# Patient Record
Sex: MALE | Race: WHITE | URBAN - METROPOLITAN AREA
[De-identification: names, ages, dates, MRNs, and addresses within clinical notes are randomized per-mention and may not be internally consistent; named-entity substitution may affect disease eponyms.]

---

## 2023-12-18 ENCOUNTER — TELEPHONE (OUTPATIENT)
Dept: NEUROLOGY | Facility: MEDICAL CENTER | Age: 63
End: 2023-12-18
Payer: COMMERCIAL

## 2023-12-18 NOTE — TELEPHONE ENCOUNTER
Spoke to patient informed we have imaging report and I requested actual imaging. I will let him know when we receive them.

## 2023-12-19 ENCOUNTER — HOSPITAL ENCOUNTER (OUTPATIENT)
Dept: RADIOLOGY | Facility: MEDICAL CENTER | Age: 63
End: 2023-12-19
Attending: NURSE PRACTITIONER
Payer: COMMERCIAL

## 2023-12-19 ENCOUNTER — HOSPITAL ENCOUNTER (OUTPATIENT)
Dept: RADIOLOGY | Facility: MEDICAL CENTER | Age: 63
End: 2023-12-19
Payer: COMMERCIAL

## 2023-12-19 ENCOUNTER — TELEPHONE (OUTPATIENT)
Dept: NEUROLOGY | Facility: MEDICAL CENTER | Age: 63
End: 2023-12-19
Payer: COMMERCIAL

## 2023-12-19 NOTE — TELEPHONE ENCOUNTER
Call [facility] we still have not received imaging. Spoke to liang and she call the department that pushes them through and they sent again.

## 2023-12-20 ENCOUNTER — OFFICE VISIT (OUTPATIENT)
Dept: NEUROLOGY | Facility: MEDICAL CENTER | Age: 63
End: 2023-12-20
Attending: STUDENT IN AN ORGANIZED HEALTH CARE EDUCATION/TRAINING PROGRAM
Payer: COMMERCIAL

## 2023-12-20 VITALS
WEIGHT: 204.37 LBS | HEIGHT: 64 IN | OXYGEN SATURATION: 97 % | BODY MASS INDEX: 34.89 KG/M2 | SYSTOLIC BLOOD PRESSURE: 140 MMHG | HEART RATE: 73 BPM | RESPIRATION RATE: 16 BRPM | DIASTOLIC BLOOD PRESSURE: 84 MMHG | TEMPERATURE: 97.3 F

## 2023-12-20 DIAGNOSIS — I63.9 OCCIPITAL STROKE (HCC): ICD-10-CM

## 2023-12-20 PROCEDURE — 3079F DIAST BP 80-89 MM HG: CPT | Performed by: INTERNAL MEDICINE

## 2023-12-20 PROCEDURE — 99202 OFFICE O/P NEW SF 15 MIN: CPT | Performed by: INTERNAL MEDICINE

## 2023-12-20 PROCEDURE — 99417 PROLNG OP E/M EACH 15 MIN: CPT | Performed by: INTERNAL MEDICINE

## 2023-12-20 PROCEDURE — 3077F SYST BP >= 140 MM HG: CPT | Performed by: INTERNAL MEDICINE

## 2023-12-20 PROCEDURE — 99205 OFFICE O/P NEW HI 60 MIN: CPT | Performed by: INTERNAL MEDICINE

## 2023-12-20 RX ORDER — CLOPIDOGREL BISULFATE 75 MG/1
75 TABLET ORAL DAILY
COMMUNITY
Start: 2023-09-27 | End: 2023-12-20

## 2023-12-20 RX ORDER — LOSARTAN POTASSIUM 50 MG/1
TABLET ORAL
COMMUNITY
Start: 2023-12-19

## 2023-12-20 RX ORDER — ROSUVASTATIN CALCIUM 20 MG/1
TABLET, COATED ORAL
COMMUNITY
Start: 2023-12-19

## 2023-12-20 RX ORDER — ASPIRIN 81 MG/1
81 TABLET, COATED ORAL DAILY
COMMUNITY
Start: 2023-11-30

## 2023-12-20 RX ORDER — HYDROCHLOROTHIAZIDE 25 MG/1
25 TABLET ORAL EVERY MORNING
COMMUNITY
Start: 2023-10-25

## 2023-12-20 NOTE — PROGRESS NOTES
[facility]  [address] [place] [address], [address]. [place] [address]  Phone: [phone] Patient Name: Dave Rodriguez  : 1960  MRN: [id number]     ASSESSMENT / PLAN       Dave Rodriguez is a 63 y.o. RHD male presenting for occipital stroke    Bilateral occipital lobe stroke  Etiology attributed to large vessel disease in bilateral vertebral arteries.  He also has stenoses in the right MCA and left ASMY.  Most of the infarcts were in the PCA/posterior circulation.     Risk factors for atherosclerosis included elevated BMI prior to weight loss 2/2 Guillain-Barré in 2016, tobacco use, alcohol use, sleep apnea.  Was not taking aspirin or a statin at the time of stroke. Echocardiogram did not show a left atrial dilation or PFO.    - Hgb A1c goal < 7  - LDL goal <70  - BP approx 120/80 (goal <140/80)    - Repeat MRI Brain  - Stop plavix, continue aspirin 81mg indefinitely  - Continue rosuvastatin 20mg daily if at goal.   - History of sleep apnea, but better since losing weight. Consider repeating sleep study as apnea can be more common after strokes.  - Continue abstaining from tobacco and alcohol.     - Return if symptoms worsen or fail to improve.    Marcos Roy DO  Neurology, Movement Disorders Specialist    BILLING DOCUMENTATION:   I spent 80 minutes reviewing the medical record, interviewing and examining the patient, discussing diagnosis and treatment, and coordinating care.        HISTORY OF PRESENT ILLNESS      Dave Rodriguez is a 63 y.o. RHD male presenting for occipital stroke    Hx of guillain-barre/Byrd Hou variant with resp failure in , EtOH and tobacco use in remission, hx of LIZZIE.    2022: Sudden onset vertigo while throwing hay. /170 while at the hospital. Possibly a stroke at the time. Also hx of neck injuries in past.     Gradual decline in 2023. Slower and more strained.     2023: woke up with bilateral leg weakness, improved by the time he went to ER. Exam  showed no DTRs at the time.    Significant worsening vision issues in the two weeks following, but eye exam was normal. MRI Brain showed bilateral occipital infarcts, with diffuse atherosclerosis on CTA. He was started on DAPT + statin, quit tobacco (cigars) + alcohol (daily beer). Remaining deficits from stroke with left inferior quadrantanopsia.    Due to GBS, he still has hands tingling, weakness. Diminished fingers and hands dexterity.     Workup  7/2023: CALVIN: negative. RF: 19 (H). ESR 14 (H)    Echo (TTE) 7/2023: EF 45-50%, no PFO, LA normal.    MRI Brain w/wo 7/2023: images reviewed.  (Report in referral)  Multiple acute and subacute infarcts of the bilateral occipital lobes, bilateral parietal occipital brain, bilateral temporal occipital brain, and left cerebellar hemisphere.  Probable acute infarct in the anterior left hippocampus.  Atherosclerosis noted in bilateral vertebral arteries and basilar artery.    CTA Head + Neck 7/2023: (report in referral)  Focal stenosis of V2 segment of the dominant left vertebral artery and severe stenosis of V4 segment nondominant right vertebral artery  Severe stenosis of P1 segment of the left PCA and luminal irregularity and stenosis of remainder of the left PCA.  Luminal irregularity and stenosis in right PCA  Severe stenosis M2 branch of right MCA.  Severe stenosis of A1 segment of the left SAMY.  Moderate stenosis of A2 and A3 of the left SAMY.  Mild atherosclerosis in bilateral carotid bulbs.      No past medical history on file.    No past surgical history on file.    No family history on file.    Social History     Socioeconomic History    Marital status:      Spouse name: Not on file    Number of children: Not on file    Years of education: Not on file    Highest education level: Not on file   Occupational History    Not on file   Tobacco Use    Smoking status: Not on file    Smokeless tobacco: Not on file   Substance and Sexual Activity    Alcohol use: Not  "on file    Drug use: Not on file    Sexual activity: Not on file   Other Topics Concern    Not on file   Social History Narrative    Not on file     Social Determinants of Health     Financial Resource Strain: Not on file   Food Insecurity: Not on file   Transportation Needs: Not on file   Physical Activity: Not on file   Stress: Not on file   Social Connections: Not on file   Intimate Partner Violence: Not on file   Housing Stability: Not on file       Current Outpatient Medications   Medication    ASPIRIN LOW DOSE 81 MG EC tablet    hydroCHLOROthiazide 25 MG Tab    losartan (COZAAR) 50 MG Tab    rosuvastatin (CRESTOR) 20 MG Tab     No current facility-administered medications for this visit.       Not on File    DATA / RESULTS      No results found for: \"25HYDROXY\", \"HGHQVTSG83\", \"TSHULTRASEN\", \"SPIFINTERP\", \"PYU820\", \"CALVIN\", \"HBA1C\", \"LDL\"       OBJECTIVE      Vitals:    12/20/23 1510   BP: (!) 140/84   BP Location: Right arm   Patient Position: Sitting   BP Cuff Size: Adult   Pulse: 73   Resp: 16   Temp: 36.3 °C (97.3 °F)   TempSrc: Temporal   SpO2: 97%   Weight: 92.7 kg (204 lb 5.9 oz)   Height: 1.613 m (5' 3.5\")     Physical Exam     General: NAD, appears stated age.      Mental status: Fund of knowledge is good.  Speech clear and fluent.    Cranial Nerves:  CN2: PERRL. LLQ impairment on visual field  CN3/4/6: EOMI. There is no nystagmus.   CN5: V1-V3 intact to light touch    CN7: Symmetric face.   CN8: Hearing grossly intact.   CN9/10/12: Soft palate and uvula rise symmetrically. Tongue midline.   CN11: Shoulder shrug intact bilaterally.     Strength  Right Left   Shoulder Abduction  5 5   Elbow Flexion 5 5   Elbow Extension  5 5   Wrist Extension  5 5   Finger Extension  5 5   Hip Flexion  5 5   Knee Extension 5 5   Ankle Dorsiflexion  5 5     Deep Tendon Reflexes: 0 biceps, brachioradialis, patella and ankles. Plantar reflexes in flexion.     Sensory: diminished to temperature in hands and " feet    Quantitative tuning fork Right Left   Hands 8 8   Feet 2 2       Cerebellar: No dysmetria with FTN or heel-to-shin.    Gait:   Posture - normal   Base - narrow   Stride length - normal   Arm swing - normal   Speed - normal  Shuffling/freezing - none  U-Turn - normal       PROCEDURE     N/A

## 2023-12-21 NOTE — PATIENT INSTRUCTIONS
Repeat MRI Brain  Stop plavix, continue aspirin 81mg indefinitely.     Hgb A1c goal < 7  LDL goal <70. Continue rosuvastatin 20mg daily if at goal.   BP approx 120/80 (goal <140/80)    History of sleep apnea, but better since losing weight. Consider repeating sleep study as it can be more common after strokes.    Continue abstaining from tobacco and alcohol.

## 2024-02-12 ENCOUNTER — TELEPHONE (OUTPATIENT)
Dept: SCHEDULING | Facility: IMAGING CENTER | Age: 64
End: 2024-02-12

## 2024-02-12 NOTE — TELEPHONE ENCOUNTER
Caller: Dave Rodriguez     Topic/issue:Patient is requesting MRI order from visit on 12/20/2023 be sent to [facility] in [facility] phone number: [phone]. Please let Mr. Rodriguez know when this has been sent.     Callback Number: [phone]     Thank you  - Roxie SAMANIEGO    day(s)

## 2024-03-08 ENCOUNTER — TELEPHONE (OUTPATIENT)
Dept: NEUROLOGY | Facility: MEDICAL CENTER | Age: 64
End: 2024-03-08
Payer: COMMERCIAL

## 2024-03-08 NOTE — TELEPHONE ENCOUNTER
VOICEMAIL  1. Caller Name: Lianet                                                         Call Back Number: [phone]    2. Message: [facility] called stating they needed and authorization.     3. Patient approves office to leave a detailed voicemail/MyChart message: N\A    I CALLED LIANET BACK AND THE AUTHORIZATION IS COMPLETE.